# Patient Record
Sex: MALE | Race: BLACK OR AFRICAN AMERICAN | NOT HISPANIC OR LATINO
[De-identification: names, ages, dates, MRNs, and addresses within clinical notes are randomized per-mention and may not be internally consistent; named-entity substitution may affect disease eponyms.]

---

## 2019-02-19 ENCOUNTER — RX RENEWAL (OUTPATIENT)
Age: 74
End: 2019-02-19

## 2019-02-19 PROBLEM — Z00.00 ENCOUNTER FOR PREVENTIVE HEALTH EXAMINATION: Status: ACTIVE | Noted: 2019-02-19

## 2019-02-22 ENCOUNTER — RX RENEWAL (OUTPATIENT)
Age: 74
End: 2019-02-22

## 2019-03-01 ENCOUNTER — RX RENEWAL (OUTPATIENT)
Age: 74
End: 2019-03-01

## 2019-03-21 ENCOUNTER — RECORD ABSTRACTING (OUTPATIENT)
Age: 74
End: 2019-03-21

## 2019-03-21 DIAGNOSIS — Z80.49 FAMILY HISTORY OF MALIGNANT NEOPLASM OF OTHER GENITAL ORGANS: ICD-10-CM

## 2019-03-22 ENCOUNTER — RX RENEWAL (OUTPATIENT)
Age: 74
End: 2019-03-22

## 2019-04-01 ENCOUNTER — LABORATORY RESULT (OUTPATIENT)
Age: 74
End: 2019-04-01

## 2019-04-02 ENCOUNTER — APPOINTMENT (OUTPATIENT)
Dept: INTERNAL MEDICINE | Facility: CLINIC | Age: 74
End: 2019-04-02
Payer: COMMERCIAL

## 2019-04-02 VITALS
DIASTOLIC BLOOD PRESSURE: 70 MMHG | SYSTOLIC BLOOD PRESSURE: 142 MMHG | BODY MASS INDEX: 23.04 KG/M2 | WEIGHT: 152 LBS | HEIGHT: 68 IN

## 2019-04-02 DIAGNOSIS — I10 ESSENTIAL (PRIMARY) HYPERTENSION: ICD-10-CM

## 2019-04-02 DIAGNOSIS — C61 MALIGNANT NEOPLASM OF PROSTATE: ICD-10-CM

## 2019-04-02 DIAGNOSIS — M15.0 PRIMARY GENERALIZED (OSTEO)ARTHRITIS: ICD-10-CM

## 2019-04-02 DIAGNOSIS — E78.5 HYPERLIPIDEMIA, UNSPECIFIED: ICD-10-CM

## 2019-04-02 PROCEDURE — 93000 ELECTROCARDIOGRAM COMPLETE: CPT

## 2019-04-02 PROCEDURE — 36415 COLL VENOUS BLD VENIPUNCTURE: CPT

## 2019-04-02 PROCEDURE — 99214 OFFICE O/P EST MOD 30 MIN: CPT | Mod: 25

## 2019-04-02 RX ORDER — DILTIAZEM HYDROCHLORIDE 180 MG/1
180 CAPSULE, EXTENDED RELEASE ORAL DAILY
Qty: 30 | Refills: 5 | Status: DISCONTINUED | COMMUNITY
Start: 2019-02-19 | End: 2019-04-02

## 2019-04-02 RX ORDER — DILTIAZEM HYDROCHLORIDE 180 MG/1
180 CAPSULE, EXTENDED RELEASE ORAL
Refills: 0 | Status: DISCONTINUED | COMMUNITY
End: 2019-04-02

## 2019-04-02 RX ORDER — DOXAZOSIN 1 MG/1
1 TABLET ORAL DAILY
Refills: 0 | Status: DISCONTINUED | COMMUNITY
End: 2019-04-02

## 2019-04-02 NOTE — HISTORY OF PRESENT ILLNESS
[FreeTextEntry1] : requesting a chest x-ray for positive PPD [de-identified] : Patient had A PPD placed at work, which was positive and they requested a chest x-ray. The patient also due for an annual exam and evaluation. His blood pressure and his prostate which is somewhat problematic in the past. 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 A note was written. The patient had no more PPD. PET testing. He will remain positive and doesn't need a chest x-ray. Constitutionally, he is eating well sleeping well. No night sweats. No chills. No fever. He has some difficulty with nocturia x2-3 sometimes and frequency during the day. He saw a urologist years ago, but hasn't since he also has not had her colonoscopy. This was discussed.

## 2019-04-02 NOTE — PHYSICAL EXAM

## 2019-04-03 LAB
ALBUMIN SERPL ELPH-MCNC: 4.2 G/DL
ALP BLD-CCNC: 90 U/L
ALT SERPL-CCNC: 13 U/L
ANION GAP SERPL CALC-SCNC: 12 MMOL/L
AST SERPL-CCNC: 21 U/L
BASOPHILS # BLD AUTO: 0.02 K/UL
BASOPHILS NFR BLD AUTO: 0.3 %
BILIRUB SERPL-MCNC: 0.4 MG/DL
BUN SERPL-MCNC: 9 MG/DL
CALCIUM SERPL-MCNC: 9.7 MG/DL
CHLORIDE SERPL-SCNC: 101 MMOL/L
CHOLEST SERPL-MCNC: 181 MG/DL
CHOLEST/HDLC SERPL: 2.3 RATIO
CO2 SERPL-SCNC: 28 MMOL/L
CREAT SERPL-MCNC: 0.94 MG/DL
EOSINOPHIL # BLD AUTO: 0.14 K/UL
EOSINOPHIL NFR BLD AUTO: 2.4 %
GLUCOSE SERPL-MCNC: 90 MG/DL
HCT VFR BLD CALC: 46.8 %
HDLC SERPL-MCNC: 78 MG/DL
HGB BLD-MCNC: 14.8 G/DL
IMM GRANULOCYTES NFR BLD AUTO: 0.2 %
LDLC SERPL CALC-MCNC: 91 MG/DL
LYMPHOCYTES # BLD AUTO: 1.91 K/UL
LYMPHOCYTES NFR BLD AUTO: 32.2 %
MAN DIFF?: NORMAL
MCHC RBC-ENTMCNC: 28.5 PG
MCHC RBC-ENTMCNC: 31.6 GM/DL
MCV RBC AUTO: 90 FL
MONOCYTES # BLD AUTO: 0.75 K/UL
MONOCYTES NFR BLD AUTO: 12.6 %
NEUTROPHILS # BLD AUTO: 3.11 K/UL
NEUTROPHILS NFR BLD AUTO: 52.3 %
PLATELET # BLD AUTO: 223 K/UL
POTASSIUM SERPL-SCNC: 4.6 MMOL/L
PROT SERPL-MCNC: 8 G/DL
PSA FREE FLD-MCNC: 6 %
PSA FREE SERPL-MCNC: 0.05 NG/ML
PSA SERPL-MCNC: 0.74 NG/ML
RBC # BLD: 5.2 M/UL
RBC # FLD: 13.5 %
SODIUM SERPL-SCNC: 141 MMOL/L
T4 FREE SERPL-MCNC: 1.1 NG/DL
TRIGL SERPL-MCNC: 58 MG/DL
TSH SERPL-ACNC: 3.32 UIU/ML
WBC # FLD AUTO: 5.94 K/UL

## 2019-04-10 ENCOUNTER — NON-APPOINTMENT (OUTPATIENT)
Age: 74
End: 2019-04-10

## 2019-04-19 ENCOUNTER — RX RENEWAL (OUTPATIENT)
Age: 74
End: 2019-04-19

## 2019-04-19 RX ORDER — DOXAZOSIN 1 MG/1
1 TABLET ORAL
Qty: 30 | Refills: 5 | Status: ACTIVE | COMMUNITY
Start: 2019-03-22 | End: 1900-01-01

## 2019-09-15 ENCOUNTER — RX RENEWAL (OUTPATIENT)
Age: 74
End: 2019-09-15

## 2019-09-15 RX ORDER — DILTIAZEM HYDROCHLORIDE 180 MG/1
180 CAPSULE, EXTENDED RELEASE ORAL
Qty: 30 | Refills: 4 | Status: ACTIVE | COMMUNITY
Start: 2019-02-22 | End: 1900-01-01

## 2020-03-18 ENCOUNTER — RX RENEWAL (OUTPATIENT)
Age: 75
End: 2020-03-18

## 2020-03-18 RX ORDER — SIMVASTATIN 40 MG/1
40 TABLET, FILM COATED ORAL DAILY
Qty: 90 | Refills: 0 | Status: ACTIVE | COMMUNITY
Start: 2019-03-01 | End: 1900-01-01